# Patient Record
(demographics unavailable — no encounter records)

---

## 2025-03-18 NOTE — PHYSICAL EXAM
[Alert] : alert [Normal Sclera/Conjunctiva] : normal sclera/conjunctiva [Normal Outer Ear/Nose] : the ears and nose were normal in appearance [No Respiratory Distress] : no respiratory distress [Normal PMI] : the apical impulse was normal [Carotids Normal] : carotid pulses were normal with no bruits [No CVA Tenderness] : no ~M costovertebral angle tenderness [No Stigmata of Cushings Syndrome] : no stigmata of Cushings Syndrome [Oriented x3] : oriented to person, place, and time

## 2025-03-25 NOTE — HISTORY OF PRESENT ILLNESS
[FreeTextEntry1] : This is a pleasant 36 yr old F seen today for a new patient visit for hx of Subclinical Hypothyroidism  She states that her TSH has been mildly elevated for > 6 yrs   However, now patient states that she is having symptoms of fatigue, constipation.  She also has a heavy menstrual cycle   No family hx of Thyroid disease  Past Sx Hx - none  Fam Hx - Father has DM  Recent Labs Feb 2025:  TSH - 6.62 H  TT4 - 5.1  ( 5.1-11.9)

## 2025-06-19 NOTE — HISTORY OF PRESENT ILLNESS
[FreeTextEntry1] : This is a pleasant 36 yr old F seen today for a follow up visit for hx of Subclinical Hypothyroidism  She states that her TSH has been mildly elevated for > 6 yrs  At her initial visit with me in March 2025, she was started on Levothyroxine 25 mcg, 1 tab daily.   She reports compliance with taking it daily as prescribed She states that she has not noted any improvement in her symptoms of fatigue and constipation.    She also has a heavy menstrual cycle    No family hx of Thyroid disease  Past Sx Hx - none  Fam Hx - Father has DM  Recent Labs Feb 2025:  TSH - 6.62 H  TT4 - 5.1  ( 5.1-11.9)